# Patient Record
Sex: MALE | Race: OTHER | NOT HISPANIC OR LATINO | URBAN - METROPOLITAN AREA
[De-identification: names, ages, dates, MRNs, and addresses within clinical notes are randomized per-mention and may not be internally consistent; named-entity substitution may affect disease eponyms.]

---

## 2020-06-01 ENCOUNTER — TELEPHONE ENCOUNTER (OUTPATIENT)
Dept: URBAN - METROPOLITAN AREA CLINIC 115 | Facility: CLINIC | Age: 40
End: 2020-06-01

## 2020-06-04 ENCOUNTER — OFFICE VISIT (OUTPATIENT)
Dept: URBAN - METROPOLITAN AREA TELEHEALTH 2 | Facility: TELEHEALTH | Age: 40
End: 2020-06-04
Payer: COMMERCIAL

## 2020-06-04 DIAGNOSIS — R93.5 ABNORMAL CT OF THE ABDOMEN: ICD-10-CM

## 2020-06-04 DIAGNOSIS — K21.9 GERD: ICD-10-CM

## 2020-06-04 DIAGNOSIS — R10.12 LUQ ABDOMINAL PAIN: ICD-10-CM

## 2020-06-04 DIAGNOSIS — F12.90 MARIJUANA USE: ICD-10-CM

## 2020-06-04 DIAGNOSIS — Z83.79 FAMILY HISTORY OF CROHN'S DISEASE: ICD-10-CM

## 2020-06-04 DIAGNOSIS — K85.90 ACUTE PANCREATITIS: ICD-10-CM

## 2020-06-04 PROBLEM — 301715003 LEFT UPPER QUADRANT PAIN: Status: ACTIVE | Noted: 2020-06-04

## 2020-06-04 PROCEDURE — G8417 CALC BMI ABV UP PARAM F/U: HCPCS | Performed by: INTERNAL MEDICINE

## 2020-06-04 PROCEDURE — G8427 DOCREV CUR MEDS BY ELIG CLIN: HCPCS | Performed by: INTERNAL MEDICINE

## 2020-06-04 PROCEDURE — 99213 OFFICE O/P EST LOW 20 MIN: CPT | Performed by: INTERNAL MEDICINE

## 2020-06-04 RX ORDER — FLECAINIDE ACETATE 50 MG/1
TABLET ORAL
Qty: 0 | Refills: 0 | Status: ACTIVE | COMMUNITY
Start: 1900-01-01

## 2020-06-04 RX ORDER — OMEPRAZOLE 40 MG/1
1 CAPSULE 30 MINUTES BEFORE MORNING MEAL CAPSULE, DELAYED RELEASE ORAL ONCE A DAY
Status: ACTIVE | COMMUNITY

## 2020-06-04 NOTE — HPI-TODAY'S VISIT:
pain in luq area, off and on, now for 3 days very frequent, and also in left flank, tried tylenol and norco from his mother. twisting pain.. no vomting, at times nausea. able to eat, bm no issues, frequent but soft, no diarrhea or contipation. no bleeding p/r. some stool dark, no nsaid. stop since last visit. no fever or wt loss. cardilogist, dr manan jessica. last seen last week, everything checked out good. COVID ANTIBODY WAS NEGATIVE. Patient seen today via telehealth by agreement and consent of patient in light of current COVID-19 pandemic. I used video conferencing during the visit. The patient encounter is appropriate and reasonable under the circumstances given the patient's particular presentation at this time. The patient has been advised of the followin) the potential risks and limitations of this mode of treatment (including but not limited to the absence of in-person examination); 2) the right to refuse telehealth services at any point without affecting the right to future care; 3) the right to receive in-person services, included immediately after this consultation if an urgent need arises; 4) information, including identifiable images or information from this telehealth consult, will only be shared in accordance with HIPAA regulations. Any and all of the patient's and/or patient's family member's questions on this issue have been answered. The patient has verbally consented to be treated via telehealth services. The patient has also been advised to contact this office for worsening conditions or problems, and seek emergency medical treatment and/or call 911 if the patient deems either necessary.

## 2020-06-10 ENCOUNTER — OFFICE VISIT (OUTPATIENT)
Dept: URBAN - METROPOLITAN AREA CLINIC 12 | Facility: CLINIC | Age: 40
End: 2020-06-10

## 2020-06-10 RX ORDER — FLECAINIDE ACETATE 50 MG/1
TABLET ORAL
Qty: 0 | Refills: 0 | COMMUNITY
Start: 1900-01-01

## 2020-06-10 RX ORDER — OMEPRAZOLE 40 MG/1
1 CAPSULE 30 MINUTES BEFORE MORNING MEAL CAPSULE, DELAYED RELEASE ORAL ONCE A DAY
COMMUNITY

## 2020-06-10 NOTE — HPI-TODAY'S VISIT:
The patient is a 39-year-old male who appears to have previously been seen in our practice by Dr. Irene since 2019 for bloating diarrhea, abdominal discomfort and pain.  He was last seen via telehealth on May 26 for ongoing abdominal pain and was ordered to have a CT of the abdomen pelvis.  He had a colonoscopy in August of last year that was unremarkable.  And his initial work-up also consisted of an upper endoscopy as well as stool samples.  It appears the patient has gone to the emergency room on multiple occasions for repeated abdominal pain.

## 2020-06-11 ENCOUNTER — LAB OUTSIDE AN ENCOUNTER (OUTPATIENT)
Dept: URBAN - METROPOLITAN AREA TELEHEALTH 2 | Facility: TELEHEALTH | Age: 40
End: 2020-06-11

## 2020-06-12 ENCOUNTER — OFFICE VISIT (OUTPATIENT)
Dept: URBAN - METROPOLITAN AREA CLINIC 81 | Facility: CLINIC | Age: 40
End: 2020-06-12
Payer: COMMERCIAL

## 2020-06-12 ENCOUNTER — LAB OUTSIDE AN ENCOUNTER (OUTPATIENT)
Dept: URBAN - METROPOLITAN AREA CLINIC 82 | Facility: CLINIC | Age: 40
End: 2020-06-12

## 2020-06-12 DIAGNOSIS — K76.0 FATTY LIVER: ICD-10-CM

## 2020-06-12 PROCEDURE — 76705 ECHO EXAM OF ABDOMEN: CPT | Performed by: INTERNAL MEDICINE

## 2020-06-12 RX ORDER — OMEPRAZOLE 40 MG/1
1 CAPSULE 30 MINUTES BEFORE MORNING MEAL CAPSULE, DELAYED RELEASE ORAL ONCE A DAY
COMMUNITY

## 2020-06-12 RX ORDER — FLECAINIDE ACETATE 50 MG/1
TABLET ORAL
Qty: 0 | Refills: 0 | COMMUNITY
Start: 1900-01-01

## 2020-06-15 LAB
IGG, SUBCLASS 1: 460
IGG, SUBCLASS 2: 275
IGG, SUBCLASS 3: 27
IGG, SUBCLASS 4: 122
IMMUNOGLOBULIN G, QN, SERUM: 1008
TRIGLYCERIDES: 64

## 2020-06-30 ENCOUNTER — CLAIMS CREATED FROM THE CLAIM WINDOW (OUTPATIENT)
Dept: URBAN - METROPOLITAN AREA CLINIC 82 | Facility: CLINIC | Age: 40
End: 2020-06-30
Payer: COMMERCIAL

## 2020-06-30 ENCOUNTER — DASHBOARD ENCOUNTERS (OUTPATIENT)
Age: 40
End: 2020-06-30

## 2020-06-30 DIAGNOSIS — F12.90 MARIJUANA USE: ICD-10-CM

## 2020-06-30 DIAGNOSIS — K85.90 ACUTE PANCREATITIS: ICD-10-CM

## 2020-06-30 DIAGNOSIS — R93.5 ABNORMAL CT OF THE ABDOMEN: ICD-10-CM

## 2020-06-30 DIAGNOSIS — K21.9 GERD: ICD-10-CM

## 2020-06-30 DIAGNOSIS — K85.80 OTHER ACUTE PANCREATITIS, UNSPECIFIED COMPLICATION STATUS: ICD-10-CM

## 2020-06-30 DIAGNOSIS — K52.89 OTHER SPECIFIED NONINFECTIVE GASTROENTERITIS AND COLITIS: ICD-10-CM

## 2020-06-30 DIAGNOSIS — K21.9 ACID REFLUX DISEASE: ICD-10-CM

## 2020-06-30 DIAGNOSIS — Z83.79 FAMILY HISTORY OF CROHN'S DISEASE: ICD-10-CM

## 2020-06-30 DIAGNOSIS — R14.0 BLOATING: ICD-10-CM

## 2020-06-30 DIAGNOSIS — K52.9 COLITIS: ICD-10-CM

## 2020-06-30 PROBLEM — 191891003 NONDEPENDENT CANNABIS ABUSE: Status: ACTIVE | Noted: 2020-06-04

## 2020-06-30 PROBLEM — 160386006 FAMILY HISTORY OF CROHN'S DISEASE: Status: ACTIVE | Noted: 2020-06-04

## 2020-06-30 PROBLEM — 15634181000119107 CT OF ABDOMEN ABNORMAL: Status: ACTIVE | Noted: 2020-06-04

## 2020-06-30 PROBLEM — 235595009 GASTROESOPHAGEAL REFLUX DISEASE: Status: ACTIVE | Noted: 2020-06-04

## 2020-06-30 PROBLEM — 197456007 ACUTE PANCREATITIS: Status: ACTIVE | Noted: 2020-06-30

## 2020-06-30 PROCEDURE — 1036F TOBACCO NON-USER: CPT | Performed by: INTERNAL MEDICINE

## 2020-06-30 PROCEDURE — 99213 OFFICE O/P EST LOW 20 MIN: CPT | Performed by: INTERNAL MEDICINE

## 2020-06-30 PROCEDURE — G8417 CALC BMI ABV UP PARAM F/U: HCPCS | Performed by: INTERNAL MEDICINE

## 2020-06-30 PROCEDURE — G8427 DOCREV CUR MEDS BY ELIG CLIN: HCPCS | Performed by: INTERNAL MEDICINE

## 2020-06-30 PROCEDURE — G9903 PT SCRN TBCO ID AS NON USER: HCPCS | Performed by: INTERNAL MEDICINE

## 2020-06-30 RX ORDER — OMEPRAZOLE 40 MG/1
1 CAPSULE 30 MINUTES BEFORE MORNING MEAL CAPSULE, DELAYED RELEASE ORAL ONCE A DAY
COMMUNITY

## 2020-06-30 RX ORDER — FLECAINIDE ACETATE 50 MG/1
TABLET ORAL
Qty: 0 | Refills: 0 | COMMUNITY
Start: 1900-01-01

## 2020-06-30 NOTE — HPI-TODAY'S VISIT:
recently went to er, er recordss reveiiewed, 6/2/20- cbc, cmp and lipase normal. ct abdomen with contrast normal. STILL HAS CONSTANT PAIN, SOMETIMES MORE SEVERE, HAPPENS IN AM, AND FEELS LIKE ORGANS TWISTING TIGHT. 3-4 TYLENOL HELPS. NO NSAIDS. BM THIS MORNING LOOSE. NO BLEEDING. ABLE TO EAT GOOD.

## 2020-07-01 LAB
AMYLASE: 77
LIPASE: 25